# Patient Record
Sex: FEMALE | Race: BLACK OR AFRICAN AMERICAN | Employment: OTHER | URBAN - METROPOLITAN AREA
[De-identification: names, ages, dates, MRNs, and addresses within clinical notes are randomized per-mention and may not be internally consistent; named-entity substitution may affect disease eponyms.]

---

## 2021-08-25 ENCOUNTER — HOSPITAL ENCOUNTER (OUTPATIENT)
Dept: PHYSICAL THERAPY | Age: 66
Discharge: HOME | End: 2021-08-25
Payer: MEDICARE

## 2021-08-25 PROCEDURE — 97110 THERAPEUTIC EXERCISES: CPT

## 2021-08-25 PROCEDURE — 97161 PT EVAL LOW COMPLEX 20 MIN: CPT

## 2021-08-25 NOTE — PROGRESS NOTES
[address] [facility] AT [address], [address] [place] [address] Road  Phone: [phone]   [phone]  PLAN OF CARE / [address] [place] [address] PHYSICAL THERAPY SERVICES  Patient Name: Abe Lynn : 1955   Medical   Diagnosis: Right shoulder pain [M25.511] Treatment Diagnosis: Right shoulder pain [M25.511]   Onset Date: chronic     Referral Source: [facility]): 2021   Prior Hospitalization: See medical history Provider #: [id number]   Prior Level of Function: Chronic difficulty with lifting/reaching/carrying activities 2/2 b/l shoulder, elbow and wrist pain. Independent with ADL's. 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   ===========================================================================================  Assessment / key information:  Pt is a 72y.o. year old LHD female with subjective complaints of chronic R shoulder pain. Pt was seen by ortho 21 and received subacromial steroid injection. Since that time pt states she no longer has her chronic shoulder pain which had previously felt like a sharp catch with reach/lift and a dull throb at rest. Pt denies any N/T or radicular pain. She states recent xray was (+) for arthritis. Current pain is rated as 2 to 10/10. Current functional limitations: lifting, reaching. FOTO score= 53/100 indicating 47% impairment to functional activities. Today's evaulation is significant for   POSTURE/OBSERVATION: increased t/s kyphosis, b/l fwd shoulders, ant tipped scapulae  FUNCTIONAL ASSESSMENT: standing R shoulder flex 150, abd 142; (vs Left flex 170, abd 180)     ROM SHOULDER:  Supine A/P:  Flex 160/150, Abd 163/165, ER (@90) 85/85.  IR (@90) 65/67. FIR T12 (p!), L T8. Left shoulder ROM grossly WNL  STRENGTH  SHOULDER:  Flex 5/5, scaption 4+/5, ER 4-/5, IR 4/5, bicep 5/5. L shoulder grossly 4+/5 to 5/5  SPECIAL TESTS: (+) lift off, chopra-juliette, empty can (weak but not painful). (-) Neer, speed's, cross arm, drop arm. ADDITIONAL FINDINGS: no ttp to shoulder musculature. Grade 2 post GH joint glide. These findings are supportive for diagnosis of R shoulder pain with rotator cuff tendonitis and subacromial impingement. Pt will be a good candidate for skilled PT to address these impairments and promote return to normal ADLs and functional mobility for improved quality of life.    ===========================================================================================  Eval Complexity: History HIGH Complexity :3+ comorbidities / personal factors will impact the outcome/ POC ;  Examination  MEDIUM Complexity : 3 Standardized tests and measures addressing body structure, function, activity limitation and / or participation in recreation ; Presentation LOW Complexity : Stable, uncomplicated ;  Decision Making MEDIUM Complexity : FOTO score of 26-74; Overall Complexity LOW   Problem List: pain affecting function, decrease ROM, decrease strength, decrease ADL/ functional abilitiies, decrease activity tolerance and decrease flexibility/ joint mobility   Treatment Plan may include any combination of the following: Therapeutic exercise, Therapeutic activities, Neuromuscular re-education, Physical agent/modality, Manual therapy, Patient education and Self Care training  Patient / Family readiness to learn indicated by: asking questions and trying to perform skills  Persons(s) to be included in education: patient (P)  Barriers to Learning/Limitations: None  Measures taken, if barriers to learning:    Patient Goal (s): \"I have no expectations. I've had physical therapy quite a bit.  Results are temporary\"   Patient self reported health status: good  Rehabilitation Potential: good   Short Term Goals: To be accomplished in  4  treatments:  1. Pt will be educated in appropriate HEP to decrease pain, increase ROM, increase strength and return pt to PLOF. 2. Pt will increase R shoulder PROM in supine to at least 170 in order to improve ease with reaching activities. 3. Pt will improve R shoulder FIR to at least T9 for improved ease with dressing.  Long Term Goals: To be accomplished in  4-6  weeks:  1. Pt will improve FOTO score to >/= 64 to demo a significant improvement in functional activity tolerance. 2. Pt will increase R shoulder ER to >/=4/5 for improved tolerance with carrying/lifting ADL's.  3. Pt will increase R shoulder flexion AROM standing to at least 165 deg for improved ease with reaching. Frequency / Duration:   Patient to be seen  1-2  times per week for 4-6  weeks: All LTG as above will be assessed and updated every 10 visits or 30 days and progressed as needed    Patient / Caregiver education and instruction: self care, activity modification and exercises  Therapist Signature: [address] LINDA Sarmiento, PT Date: 6/97/8379   Certification Period: 08/25/21 to 11/23/21 Time: 7:27 AM   ===========================================================================================  I certify that the above Physical Therapy Services are being furnished while the patient is under my care. I agree with the treatment plan and certify that this therapy is necessary. Physician Signature:        Date:       Time:        Subhash Chadwick PA-C  Please sign and return to [facility] AT [facility] or you may fax the signed copy to [phone]. Thank you.

## 2021-08-25 NOTE — PROGRESS NOTES
PHYSICAL THERAPY - DAILY TREATMENT NOTE    Patient Name: Cara Rm        Date: 2021  : 1955   yes Patient  Verified  Visit #:   1     Insurance: Payor: Felicitas Chaves / Plan: [place] MED[place] PART [place] & B / Product Type: Medicare /      In time: 11:33 Out time: 12:10   Total Treatment Time: 37     Medicare/BCBS Time Tracking (below)   Total Timed Codes (min):  37 1:1 Treatment Time:  37     TREATMENT AREA =  Right shoulder pain [M25.511]    SUBJECTIVE  Pain Level (on 0 to 10 scale):  0  / 10   Medication Changes/New allergies or changes in medical history, any new surgeries or procedures?    no  If yes, update Summary List   Subjective Functional Status/Changes:  []  No changes reported     See POC          OBJECTIVE    See exam on chart for details on objective findings    10 min Therapeutic Exercise:  [x]  See flow sheet   Rationale:      increase ROM and increase strength to improve the patients ability to lift, reach for ADL's         Billed With/As:   [x] TE   [] TA   [] Neuro   [] Self Care Patient Education: [x] Review HEP    [] Progressed/Changed HEP based on:   [] positioning   [] body mechanics   [] transfers   [] heat/ice application    [] other:      Other Objective/Functional Measures:    Review HEP, handout created and issued to pt. as per chart. Pt educated in shoulder anatomy, function and dysfunction as related to diagnosis. . Reviewed POC and goals. Pt reports understanding.        Post Treatment Pain Level (on 0 to 10) scale:   0  / 10     ASSESSMENT  Assessment/Changes in Function:     See POC     []  See Progress Note/Recertification   Patient will continue to benefit from skilled PT services to modify and progress therapeutic interventions, address functional mobility deficits, address ROM deficits, address strength deficits, analyze and address soft tissue restrictions, analyze and cue movement patterns and analyze and modify body mechanics/ergonomics to attain remaining goals.   Progress toward goals / Updated goals:    See POC     PLAN  []  Upgrade activities as tolerated yes Continue plan of care   []  Discharge due to :    []  Other:      Therapist: Boy Ramachandran.  Yumiko Leonard, PT    Date: 8/25/2021 Time: 7:25 AM     Future Appointments   Date Time Provider Chin Ray   8/25/2021 11:30 AM Wanda Stallings, PT [facility]

## 2021-09-07 ENCOUNTER — APPOINTMENT (OUTPATIENT)
Dept: PHYSICAL THERAPY | Age: 66
End: 2021-09-07
Payer: MEDICARE

## 2021-09-09 ENCOUNTER — HOSPITAL ENCOUNTER (OUTPATIENT)
Dept: PHYSICAL THERAPY | Age: 66
Discharge: HOME | End: 2021-09-09
Payer: MEDICARE

## 2021-09-09 PROCEDURE — 97140 MANUAL THERAPY 1/> REGIONS: CPT

## 2021-09-09 PROCEDURE — 97110 THERAPEUTIC EXERCISES: CPT

## 2021-09-09 NOTE — PROGRESS NOTES
PHYSICAL THERAPY - DAILY TREATMENT NOTE    Patient Name: Nicholas Muir        Date: 2021  : 1955   yes Patient  Verified  Visit #:   2   of     Insurance: Payor: Jenny Pearson / Plan: [place] MEDICARE PART A & B / Product Type: Medicare /      In time: 11:59 Out time: 12:44   Total Treatment Time: 45     Medicare/BCBS Time Tracking (below)   Total Timed Codes (min):  45 1:1 Treatment Time:  45     TREATMENT AREA =  Right shoulder pain [M25.511]    SUBJECTIVE  Pain Level (on 0 to 10 scale):  0  / 10- right shoulder  6/10 - left shoulder pain   Medication Changes/New allergies or changes in medical history, any new surgeries or procedures?    no  If yes, update Summary List   Subjective Functional Status/Changes:  []  No changes reported     Pt c/o left shoulder pain 2/2 rainy weather today. R shoulder has been doing good. Reports compliance with her HEP, though has been having difficulty logging in and tracking sessions with [employer] [facility]         OBJECTIVE    35 min Therapeutic Exercise:  [x]  See flow sheet   Rationale:      increase ROM and increase strength to improve the patients ability to lift, reach for ADL's     10 min Manual Therapy:  GH inf/post mobs grade 2-3. Manual GH IR stretching, flex/abd stretching in supine. STM to pec minor   The manual therapy interventions were performed at a separate and distinct time from the therapeutic activities interventions. Rationale: decrease pain, increase ROM, increase tissue extensibility and decrease trigger points to increase ability to perform ADL's.       Billed With/As:   [x] TE   [] TA   [] Neuro   [] Self Care Patient Education: [x] Review HEP    [] Progressed/Changed HEP based on:   [] positioning   [] body mechanics   [] transfers   [] heat/ice application    [] other:      Other Objective/Functional Measures:    -modified doorway stretch with slight L trunk ROT to decrease pain to left shoulder  -UBE for periscap activation       Post Treatment Pain Level (on 0 to 10) scale:   0  / 10- right shoulder  3/10 left shoulder     ASSESSMENT  Assessment/Changes in Function:     Pt without significant change in FIR noted today; still at T12 level; encouraged to continue with daily stretching as per HEP. Pt with significant weakness in s/l abd and unable to tolerate dumbbell resistance today. Updated HEP and pt verbalized understanding; provided paper handout     []  See Progress Note/Recertification   Patient will continue to benefit from skilled PT services to modify and progress therapeutic interventions, address functional mobility deficits, address ROM deficits, address strength deficits, analyze and address soft tissue restrictions, analyze and cue movement patterns and analyze and modify body mechanics/ergonomics to attain remaining goals. Progress toward goals / Updated goals: · Short Term Goals: To be accomplished in  4  treatments:  1. Pt will be educated in appropriate HEP to decrease pain, increase ROM, increase strength and return pt to PLOF. -9/9: goal met and progressing  2. Pt will increase R shoulder PROM in supine to at least 170 in order to improve ease with reaching activities. 3. Pt will improve R shoulder FIR to at least T9 for improved ease with dressing.     · Long Term Goals: To be accomplished in  4-6  weeks:  1. Pt will improve FOTO score to >/= 64 to demo a significant improvement in functional activity tolerance. 2. Pt will increase R shoulder ER to >/=4/5 for improved tolerance with carrying/lifting ADL's.  3. Pt will increase R shoulder flexion AROM standing to at least 165 deg for improved ease with reaching.        PLAN  []  Upgrade activities as tolerated yes Continue plan of care   []  Discharge due to :    []  Other:      Therapist: Levon Benavidez.  Armanda Favre, PT    Date: 9/9/2021 Time: 12:54 PM      Future Appointments   Date Time Provider Chin Ray   9/9/2021 11:45 AM Burton Blas PT [facility]   9/15/2021 11:30 AM Antonina Cotton., PT [facility]   9/17/2021 11:00 AM Cabrera PACHECO., PT [facility]   9/20/2021  9:30 AM Cabrera MCKEON, PT [facility]   9/22/2021  9:15 AM Cabrera PACHECO., PT [facility]   9/27/2021  9:30 AM Cabrera PACHECO., PT [facility]   9/29/2021  9:15 AM Antonina Cotton., PT [facility]   10/4/2021  9:30 AM Antonina Cotton., PT [facility]   10/6/2021  9:15 AM Antonina Cotton., PT [facility]   10/11/2021  9:30 AM Antonina Cotton., PT [facility]   10/13/2021  9:15 AM Antonina Cotton., PT [facility]   10/18/2021  9:30 AM Antonina Cotton., PT [facility]   10/20/2021  9:15 AM Antonina Cotton., PT [facility]   10/25/2021  9:30 AM Antonina Cotton., PT [facility]   10/27/2021  9:15 AM Antonina Cotton., PT [facility]

## 2021-09-15 ENCOUNTER — HOSPITAL ENCOUNTER (OUTPATIENT)
Dept: PHYSICAL THERAPY | Age: 66
Discharge: HOME | End: 2021-09-15
Payer: MEDICARE

## 2021-09-15 PROCEDURE — 97140 MANUAL THERAPY 1/> REGIONS: CPT

## 2021-09-15 PROCEDURE — 97110 THERAPEUTIC EXERCISES: CPT

## 2021-09-15 NOTE — PROGRESS NOTES
PHYSICAL THERAPY - DAILY TREATMENT NOTE    Patient Name: Marta Mckeon        Date: 9/15/2021  : 1955   yes Patient  Verified  Visit #:   3   of     Insurance: Payor: Bren Valdes / Plan: [place] MEDICARE PART A & B / Product Type: Medicare /      In time: 11:30 Out time: 12:14   Total Treatment Time: 44     Medicare/BCBS Time Tracking (below)   Total Timed Codes (min):  44 1:1 Treatment Time:  44     TREATMENT AREA =  Right shoulder pain [M25.511]    SUBJECTIVE  Pain Level (on 0 to 10 scale):  0  / 10   Medication Changes/New allergies or changes in medical history, any new surgeries or procedures?    no  If yes, update Summary List   Subjective Functional Status/Changes:  []  No changes reported     Pt reports no adverse sx's after last session. Will be seeing MD  and hoping to get referral for L shoulder/elbow pain. OBJECTIVE    36 min Therapeutic Exercise:  [x]  See flow sheet   Rationale:      increase ROM and increase strength to improve the patients ability to lift, reach for ADL's     8 min Manual Therapy:  GH inf/post mobs grade 2-3. Manual GH IR stretching, flex/abd stretching in supine. STM to pec minor   The manual therapy interventions were performed at a separate and distinct time from the therapeutic activities interventions. Rationale: decrease pain, increase ROM, increase tissue extensibility and decrease trigger points to increase ability to perform ADL's.       Billed With/As:   [x] TE   [] TA   [] Neuro   [] Self Care Patient Education: [x] Review HEP    [] Progressed/Changed HEP based on:   [] positioning   [] body mechanics   [] transfers   [] heat/ice application    [] other:      Other Objective/Functional Measures:    -UBE for periscap activation  -added band horizontal abd  -added shoulder iso abd/flex  -increased reps with s/l ER/abd     Post Treatment Pain Level (on 0 to 10) scale:   1  / 10- \"sore\"     ASSESSMENT  Assessment/Changes in Function:   Pt able to advance with light shoulder strengthening today; she did note some discomfort with isometric abd, but no pain. Pt able to tolerate exercises better today vs last session which she attributes to weather.    []  See Progress Note/Recertification   Patient will continue to benefit from skilled PT services to modify and progress therapeutic interventions, address functional mobility deficits, address ROM deficits, address strength deficits, analyze and address soft tissue restrictions, analyze and cue movement patterns and analyze and modify body mechanics/ergonomics to attain remaining goals. Progress toward goals / Updated goals: · Short Term Goals: To be accomplished in  4  treatments:  1. Pt will be educated in appropriate HEP to decrease pain, increase ROM, increase strength and return pt to PLOF. -9/9: goal met and progressing  2. Pt will increase R shoulder PROM in supine to at least 170 in order to improve ease with reaching activities. 3. Pt will improve R shoulder FIR to at least T9 for improved ease with dressing.-9/15: goal in progress; initial FIR to T12; after stretching to T11; pt reports compliance with HEP     · Long Term Goals: To be accomplished in  4-6  weeks:  1. Pt will improve FOTO score to >/= 64 to demo a significant improvement in functional activity tolerance. 2. Pt will increase R shoulder ER to >/=4/5 for improved tolerance with carrying/lifting ADL's.  3. Pt will increase R shoulder flexion AROM standing to at least 165 deg for improved ease with reaching.        PLAN  []  Upgrade activities as tolerated yes Continue plan of care   []  Discharge due to :    []  Other:      Therapist: Garfield Rizo.  Mirna Amin, PT    Date: 9/15/2021 Time: 12:16 PM      Future Appointments   Date Time Provider Chin Ray   9/15/2021 11:30 AM Mert Pollard, PT [facility]   9/17/2021 11:00 AM Mert Pollard, PT [facility]   9/20/2021  9:30 AM Cali MCKEON, PT [facility]   9/22/2021 9:15 AM Olivia Client., PT [facility]   9/27/2021  9:30 AM Monroe MCKEON, PT [facility]   9/29/2021  9:15 AM Olivia Client., PT [facility]   10/4/2021  9:30 AM Olivia Client., PT [facility]   10/6/2021  9:15 AM Olivia Client., PT [facility]   10/11/2021  9:30 AM Olivia Client., PT [facility]   10/13/2021  9:15 AM Olivia Client., PT [facility]   10/18/2021  9:30 AM Olivia Client., PT [facility]   10/20/2021  9:15 AM Olivia Client., PT [facility]   10/25/2021  9:30 AM Olivia Client., PT [facility]   10/27/2021  9:15 AM Olivia Client., PT [facility]

## 2021-09-17 ENCOUNTER — APPOINTMENT (OUTPATIENT)
Dept: PHYSICAL THERAPY | Age: 66
End: 2021-09-17
Payer: MEDICARE

## 2021-09-20 ENCOUNTER — HOSPITAL ENCOUNTER (OUTPATIENT)
Dept: PHYSICAL THERAPY | Age: 66
Discharge: HOME | End: 2021-09-20
Payer: MEDICARE

## 2021-09-20 PROCEDURE — 97110 THERAPEUTIC EXERCISES: CPT

## 2021-09-20 PROCEDURE — 97140 MANUAL THERAPY 1/> REGIONS: CPT

## 2021-09-20 NOTE — PROGRESS NOTES
PHYSICAL THERAPY - DAILY TREATMENT NOTE    Patient Name: Juan Jose Hernandez        Date: 2021  : 1955   yes Patient  Verified  Visit #:   4     Insurance: Payor: Sylvia Nuñez / Plan: [place] MEDICARE PART A & B / Product Type: Medicare /      In time: 9:30 Out time: 10:20   Total Treatment Time: 50     Medicare/BCBS Time Tracking (below)   Total Timed Codes (min):  46 1:1 Treatment Time:  46     TREATMENT AREA =  Right shoulder pain [M25.511]    SUBJECTIVE  Pain Level (on 0 to 10 scale):  0  / 10   Medication Changes/New allergies or changes in medical history, any new surgeries or procedures?    no  If yes, update Summary List   Subjective Functional Status/Changes:  []  No changes reported     Pt states pain to shoulders occurs with S/L, lifting (ie groceries/laundry), but no pain at rest usually unless she has aggravated sx's. Pt reports sore after PT, but improves by the time she gets home. OBJECTIVE    36 min Therapeutic Exercise:  [x]  See flow sheet  (-4 min UBE)   Rationale:      increase ROM and increase strength to improve the patients ability to lift, reach for ADL's     10 min Manual Therapy:  GH inf/post mobs grade 2-3. Manual GH IR stretching, flex/abd stretching in supine. STM to teres minor, levator scap   The manual therapy interventions were performed at a separate and distinct time from the therapeutic activities interventions. Rationale: decrease pain, increase ROM, increase tissue extensibility and decrease trigger points to increase ability to perform ADL's.       Billed With/As:   [x] TE   [] TA   [] Neuro   [] Self Care Patient Education: [x] Review HEP    [] Progressed/Changed HEP based on:   [] positioning   [] body mechanics   [] transfers   [] heat/ice application    [] other:      Other Objective/Functional Measures:    -UBE for periscap activation  -increased reps with band row/ext, and band IR/ER  -wrist pain limits tolerance for S/L ER     Post Treatment Pain Level (on 0 to 10) scale:   1  / 10     ASSESSMENT  Assessment/Changes in Function:   Pt noting crepitus with band IR; able to abolish with performing to decreased end range IR; pt denies pain. Pt with moderate hypertonicity to levator scap, teres minor; addressed with manual therapy. Pt noting some end range pain with manual shoulder flexion PROM stretching. Pt demonstrates good improvement in AA/AROM R shoulder since [facility] and will focus on progressive strengthening to increase functional activity tolerance. []  See Progress Note/Recertification   Patient will continue to benefit from skilled PT services to modify and progress therapeutic interventions, address functional mobility deficits, address ROM deficits, address strength deficits, analyze and address soft tissue restrictions, analyze and cue movement patterns and analyze and modify body mechanics/ergonomics to attain remaining goals. Progress toward goals / Updated goals: · Short Term Goals: To be accomplished in  4  treatments:  1. Pt will be educated in appropriate HEP to decrease pain, increase ROM, increase strength and return pt to PLOF. -9/9: goal met and progressing  2. Pt will increase R shoulder PROM in supine to at least 170 in order to improve ease with reaching activities. -9/20: goal in progress; PROM flexion ~170 with end range pain (not formally measured)  3. Pt will improve R shoulder FIR to at least T9 for improved ease with dressing.-9/15: goal in progress; initial FIR to T12; after stretching to T11; pt reports compliance with HEP     · Long Term Goals: To be accomplished in  4-6  weeks:  1. Pt will improve FOTO score to >/= 64 to demo a significant improvement in functional activity tolerance.   2. Pt will increase R shoulder ER to >/=4/5 for improved tolerance with carrying/lifting ADL's.  3. Pt will increase R shoulder flexion AROM standing to at least 165 deg for improved ease with reaching.        PLAN  []  Upgrade activities as tolerated yes Continue plan of care   []  Discharge due to :    []  Other:      Therapist: Alexa Levia, PT    Date: 9/20/2021 Time: 10:40 AM      Future Appointments   Date Time Provider Chin Ray   9/20/2021  9:30 AM Juan Pickering., PT [facility]   9/22/2021  9:15 AM Cyril MCKEON, PT [facility]   9/27/2021  9:30 AM Cyril MCKEON, PT [facility]   9/29/2021  9:15 AM Juan Pickering., PT [facility]   10/4/2021  9:30 AM Juan Pickering., PT [facility]   10/6/2021  9:15 AM Juan Pickering., PT [facility]   10/11/2021  9:30 AM Juan Pickering., PT [facility]   10/13/2021  9:15 AM Juan Pickering., PT [facility]   10/18/2021  9:30 AM Juan Pickering., PT [facility]   10/20/2021  9:15 AM Juan Pickering., PT [facility]   10/25/2021  9:30 AM Juan Pickering., PT [facility]   10/27/2021  9:15 AM Juan Pickering., PT [facility]

## 2021-09-22 ENCOUNTER — APPOINTMENT (OUTPATIENT)
Dept: PHYSICAL THERAPY | Age: 66
End: 2021-09-22
Payer: MEDICARE

## 2021-09-27 ENCOUNTER — HOSPITAL ENCOUNTER (OUTPATIENT)
Dept: PHYSICAL THERAPY | Age: 66
End: 2021-09-27
Payer: MEDICARE

## 2021-09-29 ENCOUNTER — APPOINTMENT (OUTPATIENT)
Dept: PHYSICAL THERAPY | Age: 66
End: 2021-09-29
Payer: MEDICARE

## 2021-10-04 ENCOUNTER — APPOINTMENT (OUTPATIENT)
Dept: PHYSICAL THERAPY | Age: 66
End: 2021-10-04
Payer: MEDICARE

## 2021-10-04 ENCOUNTER — HOSPITAL ENCOUNTER (OUTPATIENT)
Dept: PHYSICAL THERAPY | Age: 66
Discharge: HOME | End: 2021-10-04
Payer: MEDICARE

## 2021-10-04 PROCEDURE — 97110 THERAPEUTIC EXERCISES: CPT

## 2021-10-04 PROCEDURE — 97140 MANUAL THERAPY 1/> REGIONS: CPT

## 2021-10-04 NOTE — PROGRESS NOTES
PHYSICAL THERAPY - DAILY TREATMENT NOTE    Patient Name: Sonny Officer        Date: 10/4/2021  : 1955   yes Patient  Verified  Visit #:   5   of     Insurance: Payor: Anamaria Figueroa / Plan: [place] MEDICARE PART A & B / Product Type: Medicare /      In time: 9:32 Out time: 10:40   Total Treatment Time: 68     Medicare/BCBS Time Tracking (below)   Total Timed Codes (min):  68 1:1 Treatment Time:  68     TREATMENT AREA =  Right shoulder pain [M25.511]    SUBJECTIVE  Pain Level (on 0 to 10 scale):  0  / 10   Medication Changes/New allergies or changes in medical history, any new surgeries or procedures?    no  If yes, update Summary List   Subjective Functional Status/Changes:  []  No changes reported     See PN       OBJECTIVE    58 min Therapeutic Exercise:  [x]  See flow sheet  (-4 min UBE)   Rationale:      increase ROM and increase strength to improve the patients ability to lift, reach for ADL's     10 min Manual Therapy:  GH inf/post mobs grade 2-3. Manual GH IR stretching, flex/abd stretching in supine. STM to teres minor, levator scap   The manual therapy interventions were performed at a separate and distinct time from the therapeutic activities interventions. Rationale: decrease pain, increase ROM, increase tissue extensibility and decrease trigger points to increase ability to perform ADL's.       Billed With/As:   [x] TE   [] TA   [] Neuro   [] Self Care Patient Education: [x] Review HEP    [] Progressed/Changed HEP based on:   [] positioning   [] body mechanics   [] transfers   [] heat/ice application    [] other:      Other Objective/Functional Measures:    -UBE for periscap activation  -progressed wall slide to pillowcase b/l; pt reports still obtains good shoulder flexion stretch  -added t/s ext stretching with 1/2 FR seated     Post Treatment Pain Level (on 0 to 10) scale:   0  / 10     ASSESSMENT  Assessment/Changes in Function:   See PN     []  See Progress Note/Recertification   Patient will continue to benefit from skilled PT services to modify and progress therapeutic interventions, address functional mobility deficits, address ROM deficits, address strength deficits, analyze and address soft tissue restrictions, analyze and cue movement patterns and analyze and modify body mechanics/ergonomics to attain remaining goals. Progress toward goals / Updated goals:  See PN          PLAN  []  Upgrade activities as tolerated yes Continue plan of care   []  Discharge due to :    []  Other:      Therapist: Vinh Gonzalez.  Julio Hand, PT    Date: 10/4/2021 Time: 10:44 AM      Future Appointments   Date Time Provider Chin Ray   10/4/2021  9:30 AM Mariana May., PT [facility]   10/8/2021  9:15 AM [facility] PT JOHNY WATTS 2 [facility]   10/11/2021  9:30 AM Berkley MCKEON, PT [facility]   10/13/2021  9:15 AM Mariana May., PT [facility]   10/18/2021  9:30 AM Mariana May., PT [facility]   10/20/2021  9:15 AM Mariana May., PT [facility]   10/25/2021  9:30 AM Mariana May., PT [facility]   10/27/2021  9:15 AM Mariana May., PT [facility]

## 2021-10-04 NOTE — PROGRESS NOTES
[id number] PHYSICAL THERAPY  [place] [address], [place], [address] - Phone: [phone]  Fax: [phone]  [address] [place] [address] PHYSICAL THERAPY          Patient Name: Tamela Maxwell : 1955   Treatment/Medical Diagnosis: Right shoulder pain [M25.511]   Onset Date: chronic    Referral Source: [facility] Start of [facility]): 21   Prior Hospitalization: See Medical History Provider #: [id number]   Prior Level of Function: Chronic difficulty with lifting/reaching/carrying activities 2/2 b/l shoulder, elbow and wrist pain. Independent with ADL's. 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 1. Pt will be educated in appropriate HEP to decrease pain, increase ROM, increase strength and return pt to PLOF. Status at last Eval: : goal met and progressing Current Status: yes yes   2. Pt will increase R shoulder PROM in supine to at least 170 in order to improve ease with reaching activities. Status at last Eval: Flex 150 Current Status: 160 (p!) progressing   3. Pt will improve R shoulder FIR to at least T9 for improved ease with dressing. Status at last Eval: FIR T12 (p!) Current Status: T12 (p!) no     4. Pt will improve FOTO score to >/= 64 to demo a significant improvement in functional activity tolerance. Status at last Eval: 53 Current Status: 63 Nearly met   5. Pt will increase R shoulder ER to >/=4/5 for improved tolerance with carrying/lifting ADL's. Status at last Eval: 4-/5 Current Status: 4-/5 no   6.   Pt will increase R shoulder flexion AROM standing to at least 165 deg for improved ease with reaching   Status at last Eval: flex 150 Current Status: 145 no     Key Functional Changes/Progress: Pt reports 50% overall improvement in symptoms since beginning care. Pt reports 5-6/10 max pain, 2/10 avg pain. Pt states she is no longer having a sharp catch sensation with reaching/lifting. Pt reports stretching and doing her HEP about every other day. She feels the colder weather may be making her shoulder stiffer. Overall, pt does notice good subjective improvement with her shoulder pain and function since [facility] however objectively her measurements do not appear significantly improved. This is most likely related to recent hold of PT for 2 weeks while on a hold for possible COVID exposure, as pt had demonstrated improved strength/ROM at last session attended 2 weeks ago. Improvements: decreased pain frequency/intensity/duration. Improved tolerance for lifting (groceries), household chores. Remaining functional limitations: limited tolerance for sidelying R/L (\"pulsating/deep pain\"), household chores (ie laundry, dusting overhead)    Objective measurements:  ROM:  Flex 155/160 (p!);  Abd 157/158 (p!). IR (@90) 73. ER (@90) 88 deg  MMT: Shoulder flex 5/5, abd 4/5. ER 4-/5. IR 4/5  PALPATION: ttp to infraspinatus/teres major. Increased tone/tightness to pec minor/major. SPECIAL TEST: (-) mehdikin-juliette (previously positive), empty can.  (+) lift off    Problem List: pain affecting function, decrease ROM, decrease strength, decrease ADL/ functional abilitiies, decrease activity tolerance and decrease flexibility/ joint mobility     Treatment Plan may include any combination of the following: Therapeutic exercise, Therapeutic activities, Neuromuscular re-education, Physical agent/modality, Manual therapy and Patient education  Patient Goal(s) has been updated and includes:      Goals for this certification period include and are to be achieved in   4  weeks:  1. Pt will increase R shoulder PROM in supine to at least 170 in order to improve ease with reaching activities.   2. Pt will increase R shoulder flexion AROM standing to at least 165 deg for improved ease with reaching. 3 Pt will improve FOTO score to >/= 64 to demo a significant improvement in functional activity tolerance. 4. Pt will increase R shoulder ER to >/=4/5 for improved tolerance with carrying/lifting ADL's. Frequency / Duration:   Patient to be seen   1-2   times per week for   4    weeks:    Assessments/Recommendations: Pt will benefit from skilled progression of PT at 2 x/wk for additional 4 weeks to attain LTG's  If you have any questions/comments please contact us directly at [phone]. Thank you for allowing us to assist in the care of your patient. Therapist Signature: Silas Chávez. MASOOD Sarmiento Date: 00/5/6822   Certification Period:  Reporting Period: 08/25/21 to 11/23/21 08/25/21 to 10/04/21  Time: 10:40 AM    NOTE TO PHYSICIAN:  PLEASE COMPLETE THE ORDERS BELOW AND FAX TO   [facility] Physical Therapy: [phone]  If you are unable to process this request in 24 hours please contact our office: [phone]    ___ I have read the above report and request that my patient continue as recommended.   ___ I have read the above report and request that my patient continue therapy with the following changes/special instructions: ________________________________________________   ___ I have read the above report and request that my patient be discharged from therapy.      Physician Signature:        Date:       Time:       Kelsey Winn

## 2021-10-06 ENCOUNTER — APPOINTMENT (OUTPATIENT)
Dept: PHYSICAL THERAPY | Age: 66
End: 2021-10-06
Payer: MEDICARE

## 2021-10-08 ENCOUNTER — HOSPITAL ENCOUNTER (OUTPATIENT)
Dept: PHYSICAL THERAPY | Age: 66
Discharge: HOME | End: 2021-10-08
Payer: MEDICARE

## 2021-10-08 PROCEDURE — 97140 MANUAL THERAPY 1/> REGIONS: CPT

## 2021-10-08 PROCEDURE — 97110 THERAPEUTIC EXERCISES: CPT

## 2021-10-08 NOTE — PROGRESS NOTES
PHYSICAL THERAPY - DAILY TREATMENT NOTE    Patient Name: Velvet Pritchett        Date: 10/8/2021  : 1955   yes Patient  Verified  Visit #:   6     Insurance: Payor: Marjorie Henley / Plan: [place] MEDICARE PART A & B / Product Type: Medicare /      In time: 4068 Out time: 1005   Total Treatment Time: 48     Medicare/BCBS Time Tracking (below)   Total Timed Codes (min): 48 1:1 Treatment Time:  48     TREATMENT AREA =  Right shoulder pain [M25.511]    SUBJECTIVE  Pain Level (on 0 to 10 scale): 0/ 10; \"feeling stiff this morning, the weather is changing\"   Medication Changes/New allergies or changes in medical history, any new surgeries or procedures?    no  If yes, update Summary List   Subjective Functional Status/Changes:  []  No changes reported   Pt denies any adverse reactions from the last tx session. Notes that she is stiff today. Denies any changes since the last session. OBJECTIVE    35 min Therapeutic Exercise:  [x]  See flow sheet , increased reps from 10 to 12, for 2 sets, w/ shoulder IR/ER. Rationale:      increase ROM and increase strength to improve the patients ability to lift, reach for ADL's     13 min Manual Therapy: to right side: [address] inf/post mobs grade 3, GHJ distraction,  Manual GH IR stretching with gentle contract/relax, scapular lateral glides, contract/relax lat stretch and upper serratus anterior contract/relax stretch, flex/ stretching in supine. STM, wringing/rolling upper trap   The manual therapy interventions were performed at a separate and distinct time from the therapeutic activities interventions. Rationale: decrease pain, increase ROM, increase tissue extensibility and decrease trigger points to increase ability to perform ADL's.       Billed With/As:   [x] TE   [] TA   [] Neuro   [] Self Care Patient Education: [x] Review HEP    [] Progressed/Changed HEP based on:   [] positioning   [] body mechanics   [] transfers   [] heat/ice application    [] other:      Other Objective/Functional Measures:  -progressed reps of rubber tubing IR/ER from 2x10 to 2x12  -no elbow pain w/ shoulder rows/ext w/ rubber tubing  -supine AROM of the R shoulder: 180 deg flex, at 90 deg shoulder abd: ER 90 deg, IR 45 deg. \"minimal discomfort\" noted at endrange IR.  -seated shoulder flex: 175 deg w/ \"minimal discomfort\" no pain residual     Post Treatment Pain Level (on 0 to 10) scale:  0  / 10     ASSESSMENT  Assessment/Changes in Function:   Pt demo ability to perform rows and shoulder ext today w/ rubber tubing w/out elbow pain today. Modified activity to keep wrist in neutral, \"thumbs up\" position. Pt w/ good tolerance to all there ex without increased pain levels. Noted to have improved AROM since the last session into full range flex/ER without pain in the supine position, 95% full range flex in seated position. []  See Progress Note/Recertification   Patient will continue to benefit from skilled PT services to modify and progress therapeutic interventions, address functional mobility deficits, address ROM deficits, address strength deficits, analyze and address soft tissue restrictions, analyze and cue movement patterns and analyze and modify body mechanics/ergonomics to attain remaining goals. Progress toward goals / Updated goals:  . Pt will increase R shoulder PROM in supine to at least 170 in order to improve ease with reaching activities. 10/8/21: Pt noted to have 180 d flex in supine. 2. Pt will increase R shoulder flexion AROM standing to at least 165 deg for improved ease with reaching. 10/8/21: Pt noted to have 175 d flex in seated position, minimal scap substitution. 3 Pt will improve FOTO score to >/= 64 to demo a significant improvement in functional activity tolerance.   4. Pt will increase R shoulder ER to >/=4/5 for improved tolerance with carrying/lifting ADL's.        PLAN  [x]  Upgrade activities as tolerated yes Continue plan of care   []  Discharge due to :    [] Other:      Therapist: Cielo Wood, PT    Date: 10/8/2021 Time: 1005 AM      Future Appointments   Date Time Provider Chin Flor   10/8/2021  9:15 AM [facility] PT CHILLED CELESTINOD 2 [facility]   10/11/2021  9:30 AM Court King., PT [facility]   10/13/2021  9:15 AM Court King., PT [facility]   10/18/2021  9:30 AM Court King., PT [facility]   10/20/2021  9:15 AM Court King., PT [facility]   10/25/2021  9:30 AM Court King., PT [facility]   10/27/2021  9:15 AM Court King., PT [facility]

## 2021-10-11 ENCOUNTER — HOSPITAL ENCOUNTER (OUTPATIENT)
Dept: PHYSICAL THERAPY | Age: 66
Discharge: HOME | End: 2021-10-11
Payer: MEDICARE

## 2021-10-11 PROCEDURE — 97140 MANUAL THERAPY 1/> REGIONS: CPT

## 2021-10-11 PROCEDURE — 97110 THERAPEUTIC EXERCISES: CPT

## 2021-10-11 NOTE — PROGRESS NOTES
PHYSICAL THERAPY - DAILY TREATMENT NOTE    Patient Name: Luis E Graft        Date: 10/11/2021  : 1955   yes Patient  Verified  Visit #:     Insurance: Payor:  Darline / Plan: [place] MEDICARE PART A & B / Product Type: Medicare /      In time: 9:31 Out time: 10:21   Total Treatment Time: 50     Medicare/BCBS Time Tracking (below)   Total Timed Codes (min): 46 1:1 Treatment Time:  39     TREATMENT AREA =  Right shoulder pain [M25.511]    SUBJECTIVE  Pain Level (on 0 to 10 scale): 0  / 10   Medication Changes/New allergies or changes in medical history, any new surgeries or procedures?    no  If yes, update Summary List   Subjective Functional Status/Changes:  []  No changes reported     Pt reports good response to last session stating the manual therapy last session was helpful. Pt reports overall decreased pain intensity since prior to PT. \"When I'm in the shower I try to go higher with the (IR stretch) and it just won't go further\"       OBJECTIVE    29 1:1  (36) min Therapeutic Exercise:  [x]  See flow sheet    Rationale:      increase ROM and increase strength to improve the patients ability to lift, reach for ADL's     10 min Manual Therapy: to right side: [facility] inf/post mobs grade 3, GHJ distraction,  Manual GH IR stretching with gentle contract/relax. STM teres minor, mid trap. The manual therapy interventions were performed at a separate and distinct time from the therapeutic activities interventions. Rationale: decrease pain, increase ROM, increase tissue extensibility and decrease trigger points to increase ability to perform ADL's.       Billed With/As:   [x] TE   [] TA   [] Neuro   [] Self Care Patient Education: [x] Review HEP    [] Progressed/Changed HEP based on:   [] positioning   [] body mechanics   [] transfers   [] heat/ice application    [] other:      Other Objective/Functional Measures:  -cues to maintain neutral wrist position with horizontal band abd; vc's for correct technique with exercise  -attempted to advance low trap strengthening from reach, roll lift to D2 band flex, however pt requires 1:1 manual and verbal cues to maintain neutral wrist position; pt performed more reps than instructed and did not stop when she felt pain to wrists, noting increased wrist pain after PT told pt to stop (which was likely at least 5 reps more than instructed to perform). Post Treatment Pain Level (on 0 to 10) scale:  0  / 10     ASSESSMENT  Assessment/Changes in Function:   Pt able to demonstrate improved shoulder IR ROM after manual therapy; continues to be limited with self stretching, however she did report improved stretch felt with addition of sleeper stretch today (pt was provided with HEP handout). Pt did have end range flex/scaption pain report with manual stretching. Pt requires ongoing skilled supervision with exercises to ensure good form and correct reps 2/2 tendency for decreased body awareness. []  See Progress Note/Recertification   Patient will continue to benefit from skilled PT services to modify and progress therapeutic interventions, address functional mobility deficits, address ROM deficits, address strength deficits, analyze and address soft tissue restrictions, analyze and cue movement patterns and analyze and modify body mechanics/ergonomics to attain remaining goals. Progress toward goals / Updated goals:  . Pt will increase R shoulder PROM in supine to at least 170 in order to improve ease with reaching activities. 10/8/21: Pt noted to have 180 d flex in supine. 2. Pt will increase R shoulder flexion AROM standing to at least 165 deg for improved ease with reaching. 10/8/21: Pt noted to have 175 d flex in seated position, minimal scap substitution. 3 Pt will improve FOTO score to >/= 64 to demo a significant improvement in functional activity tolerance.   4. Pt will increase R shoulder ER to >/=4/5 for improved tolerance with carrying/lifting ADL's.        PLAN  [x]  Upgrade activities as tolerated yes Continue plan of care   []  Discharge due to :    []  Other:      Therapist: Jayson Perla.  Enrique Salter, PT    Date: 10/11/2021 Time: 10:27 AM      Future Appointments   Date Time Provider Chin Ray   10/11/2021  9:30 AM Dana Garcia, PT [facility]   10/13/2021  9:15 AM Dana Grigsby., PT [facility]   10/18/2021  9:30 AM Dana Grigsby., PT [facility]   10/20/2021  9:15 AM Dana Grgisby., PT [facility]   10/25/2021  9:30 AM Dana Grigsby., PT [facility]   10/27/2021  9:15 AM Dana Grigsby., PT [facility]

## 2021-10-13 ENCOUNTER — HOSPITAL ENCOUNTER (OUTPATIENT)
Dept: PHYSICAL THERAPY | Age: 66
Discharge: HOME | End: 2021-10-13
Payer: MEDICARE

## 2021-10-13 PROCEDURE — 97140 MANUAL THERAPY 1/> REGIONS: CPT

## 2021-10-13 PROCEDURE — 97110 THERAPEUTIC EXERCISES: CPT

## 2021-10-13 NOTE — PROGRESS NOTES
PHYSICAL THERAPY - DAILY TREATMENT NOTE    Patient Name: Rell Ayon        Date: 10/13/2021  : 1955   yes Patient  Verified  Visit #:     Insurance: Payor: Anca Meadows / Plan: [place] MEDICARE PART A & B / Product Type: Medicare /      In time: 9:15 Out time: 10:12   Total Treatment Time: 57     Medicare/BCBS Time Tracking (below)   Total Timed Codes (min): 53 1:1 Treatment Time:  38     TREATMENT AREA =  Right shoulder pain [M25.511]    SUBJECTIVE  Pain Level (on 0 to 10 scale): 0  / 10   Medication Changes/New allergies or changes in medical history, any new surgeries or procedures?    no  If yes, update Summary List   Subjective Functional Status/Changes:  []  No changes reported     Pt states her wrists feel fine; no adverse sx's after last session. Reports her pain is gradually reducing with PT treatment. OBJECTIVE    28 1:1  (43) min Therapeutic Exercise:  [x]  See flow sheet    Rationale:      increase ROM and increase strength to improve the patients ability to lift, reach for ADL's     10 min Manual Therapy: to right side: [address] inf/post mobs grade 3, GHJ distraction,  Manual GH IR stretching with gentle contract/relax. PROM shoulder flexion/scaption. STM teres minor, mid trap. The manual therapy interventions were performed at a separate and distinct time from the therapeutic activities interventions. Rationale: decrease pain, increase ROM, increase tissue extensibility and decrease trigger points to increase ability to perform ADL's.       Billed With/As:   [x] TE   [] TA   [] Neuro   [] Self Care Patient Education: [x] Review HEP    [] Progressed/Changed HEP based on:   [] positioning   [] body mechanics   [] transfers   [] heat/ice application    [] other:      Other Objective/Functional Measures:  -increased resistance with band IR; unable to advance with band ER 2/2 weakness; requires cues for proper technique to stabilize upper arm with adductor towel  -modified band D2 flex to prone \"Y\" 2/2 previous wrist pain with band; pt c/o R elbow pain with 0 lb but states she can complete reps; able to perform with 1.5# wrist weight on LUE.  -added sleeper stretch  -increased resistance with S/L ER/Abd     Post Treatment Pain Level (on 0 to 10) scale:  0  / 10     ASSESSMENT  Assessment/Changes in Function:   Pt continues to require 1:1 supervision for correct form and technique with familiar exercises. Pt noting improved tolerance for exercises indicating advancing strength. Pt continues to be limited by alternate elbow or wrist discomfort requiring modifications. Will continue progression of ijeoma-scap strength for decreased impingement c/o.      []  See Progress Note/Recertification   Patient will continue to benefit from skilled PT services to modify and progress therapeutic interventions, address functional mobility deficits, address ROM deficits, address strength deficits, analyze and address soft tissue restrictions, analyze and cue movement patterns and analyze and modify body mechanics/ergonomics to attain remaining goals. Progress toward goals / Updated goals:    · Goals for this certification period include and are to be achieved in   4  weeks:  1. Pt will increase R shoulder PROM in supine to at least 170 in order to improve ease with reaching activities. 10/8/21: Pt noted to have 180 d flex in supine. 2. Pt will increase R shoulder flexion AROM standing to at least 165 deg for improved ease with reaching. 10/8/21: Pt noted to have 175 d flex in seated position, minimal scap substitution. 3 Pt will improve FOTO score to >/= 64 to demo a significant improvement in functional activity tolerance.   4. Pt will increase R shoulder ER to >/=4/5 for improved tolerance with carrying/lifting ADL's.-10/13: goal in progress; increased resistance with S/L ER today        PLAN  [x]  Upgrade activities as tolerated yes Continue plan of care   []  Discharge due to :    []  Other: Therapist: Kelly Mota, PT    Date: 10/13/2021 Time: 10:17 AM      Future Appointments   Date Time Provider Chin Ray   10/13/2021  9:15 AM Ivan Loss., PT [facility]   10/18/2021  9:30 AM Ivan Loss., PT [facility]   10/20/2021  9:15 AM Ivan Loss., PT [facility]   10/25/2021  9:30 AM Ivan Loss., PT [facility]   10/27/2021  9:15 AM Ivan Loss., PT [facility]

## 2021-10-18 ENCOUNTER — HOSPITAL ENCOUNTER (OUTPATIENT)
Dept: PHYSICAL THERAPY | Age: 66
Discharge: HOME | End: 2021-10-18
Payer: MEDICARE

## 2021-10-18 PROCEDURE — 97140 MANUAL THERAPY 1/> REGIONS: CPT

## 2021-10-18 PROCEDURE — 97110 THERAPEUTIC EXERCISES: CPT

## 2021-10-18 NOTE — PROGRESS NOTES
PHYSICAL THERAPY - DAILY TREATMENT NOTE    Patient Name: Keegan Carlson        Date: 10/18/2021  : 1955   yes Patient  Verified  Visit #:     Insurance: Payor: Teetee Méndez / Plan: [place] & B / Product Type: Medicare /      In time: 9:35 Out time: 10:30   Total Treatment Time: 55     Medicare/BCBS Time Tracking (below)   Total Timed Codes (min): 41 1:1 Treatment Time:  41     TREATMENT AREA =  Right shoulder pain [M25.511]    SUBJECTIVE  Pain Level (on 0 to 10 scale): 4-5  / 10- R neck  3/10 Left shoulder   Medication Changes/New allergies or changes in medical history, any new surgeries or procedures?    no  If yes, update Summary List   Subjective Functional Status/Changes:  []  No changes reported     States she woke up later that night with sharp pain to R neck (indicates SCM/UT region); \"I couldn't even turn it\". She took 2 Tylenol and heat. Next 2 days \"it was bad\". Then Saturday/Sun she was able to do some of her light HEP.         OBJECTIVE  Modalities Rationale:     decrease pain and increase tissue extensibility to improve patient's ability to change/maintain body position   min [] Estim, type/location:                                      []  att     []  unatt     []  w/US     []  w/ice    []  w/heat    min []  Mechanical Traction: type/lbs                   []  pro   []  sup   []  int   []  cont    []  before manual    []  after manual    min []  Ultrasound, settings/location:      min []  Iontophoresis w/ dexamethasone, location:                                               []  take home patch       []  in clinic   10 min []  Ice     [x]  Heat    location/position: R UT in reclined long sitting    min []  Vasopneumatic Device, press/temp:        min []  Other:    [x] Skin assessment post-treatment (if applicable):    [x]  intact    []  redness- no adverse reaction                  []redness  adverse reaction:        29  1:1 min Therapeutic Exercise:  [x]  See flow sheet    Rationale:      increase ROM and increase strength to improve the patients ability to lift, reach for ADL's     12 min Manual Therapy: to right side: STM R UT, SCM. Manual R UT stretching. [address] [facility] [address] inf/post mobs grade 3. Manual GH IR stretching with gentle contract/relax. PROM shoulder flexion/scaption. The manual therapy interventions were performed at a separate and distinct time from the therapeutic activities interventions. Rationale: decrease pain, increase ROM, increase tissue extensibility and decrease trigger points to increase ability to perform ADL's. Billed With/As:   [x] TE   [] TA   [] Neuro   [] Self Care Patient Education: [x] Review HEP    [] Progressed/Changed HEP based on:   [] positioning   [] body mechanics   [] transfers   [] heat/ice application    [] other:      Other Objective/Functional Measures:  -attempted prone \"Y\"/\"T\" however pt with increased UT pain and unable to tolerate today; modified to H/L horiz. abd with PTB loop around proximal forearms and cues for increased MT activation. -modified D2 band flexion to PTB loop around forearms and small range flex/abd unilat.   -added UT/LS stretching     Post Treatment Pain Level (on 0 to 10) scale:  2  / 10     ASSESSMENT  Assessment/Changes in Function:   Pt with significant ttp and moderate hypertonicity to R UT/SCM; addressed with manual therapy and pt noting good relief of sx's. Exercise tolerance limited by ongoing R UT pain with activation and pt required 1:1 cues/supervision to promote improved MT/LT activation with exercises. Pt ed on continued gentle stretches for HEP.      []  See Progress Note/Recertification   Patient will continue to benefit from skilled PT services to modify and progress therapeutic interventions, address functional mobility deficits, address ROM deficits, address strength deficits, analyze and address soft tissue restrictions, analyze and cue movement patterns and analyze and modify body mechanics/ergonomics to attain remaining goals. Progress toward goals / Updated goals:    · Goals for this certification period include and are to be achieved in   4  weeks:  1. Pt will increase R shoulder PROM in supine to at least 170 in order to improve ease with reaching activities. 10/8/21: Pt noted to have 180 d flex in supine. 2. Pt will increase R shoulder flexion AROM standing to at least 165 deg for improved ease with reaching. 10/8/21: Pt noted to have 175 d flex in seated position, minimal scap substitution. 3 Pt will improve FOTO score to >/= 64 to demo a significant improvement in functional activity tolerance. -10/18: goal not met; pt reporting decreased activity tolerance this weekend; couldn't reach overhead or unload . 4. Pt will increase R shoulder ER to >/=4/5 for improved tolerance with carrying/lifting ADL's.-10/13: goal in progress; increased resistance with S/L ER today        PLAN  [x]  Upgrade activities as tolerated yes Continue plan of care   []  Discharge due to :    []  Other:      Therapist: Perla Chappell.  Stephanie Calvillo, PT    Date: 10/18/2021 Time: 10:38 AM      Future Appointments   Date Time Provider Chin Ray   10/18/2021  9:30 AM David Boyce, PT [facility]   10/20/2021  9:15 AM David Boyce, PT [facility]   10/25/2021  9:30 AM David Kilgore., PT [facility]   10/27/2021  9:15 AM David Kilgore., PT [facility]

## 2021-10-20 ENCOUNTER — HOSPITAL ENCOUNTER (OUTPATIENT)
Dept: PHYSICAL THERAPY | Age: 66
Discharge: HOME | End: 2021-10-20
Payer: MEDICARE

## 2021-10-20 PROCEDURE — 97110 THERAPEUTIC EXERCISES: CPT

## 2021-10-20 PROCEDURE — 97140 MANUAL THERAPY 1/> REGIONS: CPT

## 2021-10-20 NOTE — PROGRESS NOTES
PHYSICAL THERAPY - DAILY TREATMENT NOTE    Patient Name: Arlette Thibodeaux        Date: 10/20/2021  : 1955   yes Patient  Verified  Visit #:   10     Insurance: Payor: Kendrick Durán / Plan: [place] MEDICARE PART A & B / Product Type: Medicare /      In time: 9:16 Out time: 10:04   Total Treatment Time: 48     Medicare/BCBS Time Tracking (below)   Total Timed Codes (min):  44 1:1 Treatment Time:   44     TREATMENT AREA =  Right shoulder pain [M25.511]    SUBJECTIVE  Pain Level (on 0 to 10 scale): 0  / 10   Medication Changes/New allergies or changes in medical history, any new surgeries or procedures?    no  If yes, update Summary List   Subjective Functional Status/Changes:  []  No changes reported     Pt reports her neck pain is better, \"it's more above my shoulder\". Reports pain with attempting to lift or reach overhead.         OBJECTIVE  Modalities Rationale:     decrease pain and increase tissue extensibility to improve patient's ability to change/maintain body position   min [] Estim, type/location:                                      []  att     []  unatt     []  w/US     []  w/ice    []  w/heat    min []  Mechanical Traction: type/lbs                   []  pro   []  sup   []  int   []  cont    []  before manual    []  after manual    min []  Ultrasound, settings/location:      min []  Iontophoresis w/ dexamethasone, location:                                               []  take home patch       []  in clinic   PD min []  Ice     [x]  Heat    location/position: R UT in reclined long sitting    min []  Vasopneumatic Device, press/temp:        min []  Other:    [x] Skin assessment post-treatment (if applicable):    [x]  intact    []  redness- no adverse reaction                  []redness  adverse reaction:        36  1:1 min Therapeutic Exercise:  [x]  See flow sheet (-4 min UBE)   Rationale:      increase ROM and increase strength to improve the patients ability to lift, reach for ADL's     8 min Manual Therapy: to right side: STM R UT, [facility]. [facility] inf/post mobs grade 3. Manual GH IR stretching and lat release. PROM shoulder flexion/scaption. The manual therapy interventions were performed at a separate and distinct time from the therapeutic activities interventions. Rationale: decrease pain, increase ROM, increase tissue extensibility and decrease trigger points to increase ability to perform ADL's. Billed With/As:   [x] TE   [] TA   [] Neuro   [] Self Care Patient Education: [x] Review HEP    [] Progressed/Changed HEP based on:   [] positioning   [] body mechanics   [] transfers   [] heat/ice application    [x] other: pt requests and provided with updated HEP (See chart copy)     Other Objective/Functional Measures:  -pt c/o sharp pain to anterior right shoulder with doorway stretch, held  -progressed wall slide to wall slide with lift off; no pain c/o  -wall zuleika limited to ~40 deg 2/2 neck/shoulder pain b/l.  -modified band ER to peach loop seated with cues for neutral spinal alignment.   -added band resisted post shoulder rolls for UT release  -added SA punch     Post Treatment Pain Level (on 0 to 10) scale:  0  / 10     ASSESSMENT  Assessment/Changes in Function:   Pt with significant tightness to b/l pecs; humeral head sitting anteriorly in glenoid fossa and after manual releases and mobilizations good correction of alignment with posterior GH mob causing ~1 inch posterior motion to re-center in glenoid fossa. Pt continues to demonstrate FHRS posture and ed on importance of gentle pec stretching and postural corrections throughout the day especially with reaching activities to avoid impingement. Plan 2 sessions to promote ability to progress to finalized long term HEP for self management; discussed with pt who verbalizes understanding and agreement.      []  See Progress Note/Recertification   Patient will continue to benefit from skilled PT services to modify and progress therapeutic interventions, address functional mobility deficits, address ROM deficits, address strength deficits, analyze and address soft tissue restrictions, analyze and cue movement patterns and analyze and modify body mechanics/ergonomics to attain remaining goals. Progress toward goals / Updated goals:    · Goals for this certification period include and are to be achieved in   4  weeks:  1. Pt will increase R shoulder PROM in supine to at least 170 in order to improve ease with reaching activities. 10/8/21: Pt noted to have 180 d flex in supine. 2. Pt will increase R shoulder flexion AROM standing to at least 165 deg for improved ease with reaching. 10/8/21: Pt noted to have 175 d flex in seated position, minimal scap substitution. 3 Pt will improve FOTO score to >/= 64 to demo a significant improvement in functional activity tolerance. -10/18: goal not met; pt reporting decreased activity tolerance this weekend; couldn't reach overhead or unload . 4. Pt will increase R shoulder ER to >/=4/5 for improved tolerance with carrying/lifting ADL's.-10/13: goal in progress; increased resistance with S/L ER today        PLAN  [x]  Upgrade activities as tolerated yes Continue plan of care   []  Discharge due to :    []  Other:      Therapist: Rene Henson.  Silviano Calloway, PT    Date: 10/20/2021 Time: 10:10 AM      Future Appointments   Date Time Provider Chin Ray   10/20/2021  9:15 AM Idania Peres, PT [facility]   10/25/2021  9:30 AM Idania Peres, PT [facility]   10/27/2021  9:15 AM Idania Peres, PT [facility]

## 2021-10-25 ENCOUNTER — HOSPITAL ENCOUNTER (OUTPATIENT)
Dept: PHYSICAL THERAPY | Age: 66
Discharge: HOME | End: 2021-10-25
Payer: MEDICARE

## 2021-10-25 PROCEDURE — 97110 THERAPEUTIC EXERCISES: CPT

## 2021-10-25 PROCEDURE — 97140 MANUAL THERAPY 1/> REGIONS: CPT

## 2021-10-25 NOTE — PROGRESS NOTES
Physical Therapy Discharge Instructions  [address]  [address], [place] [address]  P: [phone] F: [phone]    Patient: Yaron Rodriguez  : 1955    Reason for Discharge From PT:  [x]Met/progressing towards all set goals    []Minimal progress made towards set goals    []Met a plateau in progress/improvement    []Insurance/financial issues      Recommendations:   [x] Return to activity with home program as prescribed on print-outs       Continue with      [x] Ice [] Heat   as needed for 10-15 minutes to relieve pain  *If pain does not improve after several days, follow-up with your physician for a consult*           Follow up with MD:     [] Upon completion of therapy   [x] As needed        Thank you for choosing [facility] [place] [place] for your PT needs! Mi Sarmiento, PT 10/25/2021 10:23 AM

## 2021-10-25 NOTE — PROGRESS NOTES
PHYSICAL THERAPY - DAILY TREATMENT NOTE    Patient Name: Michael Casper        Date: 10/25/2021  : 1955   yes Patient  Verified  Visit #:     Insurance: Payor: Vanesa Hazard / Plan: [place] MEDICARE PART A & B / Product Type: Medicare /      In time: 9:30 Out time: 10:30   Total Treatment Time: 60     Medicare/BCBS Time Tracking (below)   Total Timed Codes (min):  56 1:1 Treatment Time:   46     TREATMENT AREA =  Right shoulder pain [M25.511]    SUBJECTIVE  Pain Level (on 0 to 10 scale): 0  / 10- right shoulder  3/10- left shoulder   Medication Changes/New allergies or changes in medical history, any new surgeries or procedures?    no  If yes, update Summary List   Subjective Functional Status/Changes:  []  No changes reported     \"I feel like I'm 75-80% improved. My strength has improved. I can do stuff around the house better\"       OBJECTIVE    36  1:1  (46) min Therapeutic Exercise:  [x]  See flow sheet (-4 min UBE)   Rationale:      increase ROM and increase strength to improve the patients ability to lift, reach for ADL's     10 min Manual Therapy: to right side: STM R UT, LS. [address] inf/post mobs grade 3. Manual GH IR stretching and lat release. PROM shoulder flexion/scaption. The manual therapy interventions were performed at a separate and distinct time from the therapeutic activities interventions. Rationale: decrease pain, increase ROM, increase tissue extensibility and decrease trigger points to increase ability to perform ADL's.       Billed With/As:   [x] TE   [] TA   [] Neuro   [] Self Care Patient Education: [x] Review HEP    [] Progressed/Changed HEP based on:   [] positioning   [] body mechanics   [] transfers   [] heat/ice application    [x] other:      Other Objective/Functional Measures:  See d/c   Post Treatment Pain Level (on 0 to 10) scale:  0  / 10     ASSESSMENT  Assessment/Changes in Function:   See d/c           Progress toward goals / Updated goals:    See d/c    PLAN  [x]  Upgrade activities as tolerated no Continue plan of care   []  Discharge due to :    []  Other:      Therapist: Shira Fitzpatrick.  Stu Calixto, PT    Date: 10/25/2021 Time: 1:20 PM      Future Appointments   Date Time Provider Chin Ray   10/25/2021  9:30 AM Sheyla Fierro, PT [facility]   10/27/2021  9:15 AM Sheyla Fierro, PT [facility]

## 2021-10-25 NOTE — PROGRESS NOTES
[facility] PHYSICAL THERAPY  [facility] [address], [facility], [address] - Phone: [phone]  Fax: [phone]  DISCHARGE SUMMARY FOR PHYSICAL THERAPY          Patient Name: Rhiannon Gonzalez : 1955   Treatment/Medical Diagnosis: Right shoulder pain [M25.511]   Onset Date: chronic    Referral Source: Donte Santamaria [facility]): 21   Prior Hospitalization: See Medical History Provider #: [id number]   Prior Level of Function: Chronic difficulty with lifting/reaching/carrying activities 2/2 b/l shoulder, elbow and wrist pain. Independent with ADL's. 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: 11 Missed Visits: 2     Goal/Measure of Progress Goal Met? 1. Pt will increase R shoulder PROM in supine to at least 170 in order to improve ease with reaching activities. Status at last Eval: SOC:  150  10/4:  160 (p!) Current Status: 170 yes   2. Pt will increase R shoulder flexion AROM standing to at least 165 deg for improved ease with reaching. Status at last Eval: SOC:  150  10/4:  145 Current Status: 155 In progress   3. Pt will improve FOTO score to >/= 64 to demo a significant improvement in functional activity tolerance. Status at last Eval: [facility] 53  10/4:  63 Current Status: 60 In progress     3. Pt will increase R shoulder ER to >/=4/5 for improved tolerance with carrying/lifting ADL's. Status at last Eval: 4-/5 Current Status: 4-/5 no     Key Functional Changes/Progress: Pt has been seen for 11 sessions of skilled PT for dx: Right shoulder pain with impingement and rotator cuff tendonitis. Pt reports good improvement in RUE function since [facility] stating, \"it's easier to reach and turn my head now\".  Pt has been able to show objective improvement in R shoulder ROM since [facility] which has allowed for improved ease with ADL's. She does continue to have reduced shoulder strength; progression of which has been limited by additional elbow/wrist pain limitations. Pt reports current pain is 2/10 at the worst and average throughout the day. She reports current functional limitations to include overhead reaching (especially with any weight such as dishes), reaching behind the back (FIR R T8, L T6). GROC +6. FOTO 60 (+7 point change from [facility] At this time pt has been educated on an appropriate long term HEP and is in agreement to continue independently as she has reached a plateau in her progress. Assessments/Recommendations: Discontinue therapy. Progressing towards or have reached established goals. If you have any questions/comments please contact us directly at [phone]. Thank you for allowing us to assist in the care of your patient. Therapist Signature: Althea Sarmiento PT Date: 10/25/21    Reporting Period: 8/25/21 to 10/25/21  Time: 10:25 AM

## 2021-10-27 ENCOUNTER — APPOINTMENT (OUTPATIENT)
Dept: PHYSICAL THERAPY | Age: 66
End: 2021-10-27
Payer: MEDICARE